# Patient Record
Sex: FEMALE | Race: ASIAN | NOT HISPANIC OR LATINO | URBAN - METROPOLITAN AREA
[De-identification: names, ages, dates, MRNs, and addresses within clinical notes are randomized per-mention and may not be internally consistent; named-entity substitution may affect disease eponyms.]

---

## 2021-05-12 ENCOUNTER — TELEHEALTH PROVIDED OTHER THAN IN PATIENT'S HOME (OUTPATIENT)
Dept: URBAN - METROPOLITAN AREA TELEHEALTH 7 | Facility: TELEHEALTH | Age: 21
End: 2021-05-12
Payer: MEDICAID

## 2021-05-12 VITALS — WEIGHT: 95 LBS | HEIGHT: 62 IN

## 2021-05-12 DIAGNOSIS — E55.9 VITAMIN D DEFICIENCY, UNSPECIFIED: ICD-10-CM

## 2021-05-12 DIAGNOSIS — D50.9 IRON DEFICIENCY ANEMIA, UNSPECIFIED: ICD-10-CM

## 2021-05-12 PROCEDURE — 99204 OFFICE O/P NEW MOD 45 MIN: CPT | Mod: 95 | Performed by: INTERNAL MEDICINE

## 2021-05-12 NOTE — SERVICEHPINOTES
PATIENT VERIFIED BY DATE OF BIRTH AND NAME. Patient has been consented for this telecommunication visit.   ROSANNE CAGLE   is a   20  female who presents for evaluation for possible celiac disease. Due to iron def anemia and vit d deficiency her celiac panel was checked and she had positive celiac antibodies.  She denies any GI symptoms. No abdominal pain, nausea, vomiting, diarrhea, constipation, melena, rectal bleeding, loss of appetite, weight loss. She is hesitant for EGD.  Statement Selected